# Patient Record
Sex: FEMALE | Race: WHITE | NOT HISPANIC OR LATINO | Employment: UNEMPLOYED | URBAN - METROPOLITAN AREA
[De-identification: names, ages, dates, MRNs, and addresses within clinical notes are randomized per-mention and may not be internally consistent; named-entity substitution may affect disease eponyms.]

---

## 2019-05-03 PROBLEM — I48.0 PAROXYSMAL ATRIAL FIBRILLATION: Status: ACTIVE | Noted: 2019-05-03

## 2019-05-28 PROBLEM — I10 ESSENTIAL HYPERTENSION: Status: ACTIVE | Noted: 2019-05-28

## 2019-08-06 PROBLEM — T73.3XXA FATIGUE DUE TO EXCESSIVE EXERTION: Status: ACTIVE | Noted: 2019-08-06

## 2020-08-18 PROBLEM — M54.42 CHRONIC BILATERAL LOW BACK PAIN WITH BILATERAL SCIATICA: Status: ACTIVE | Noted: 2020-08-18

## 2020-08-18 PROBLEM — M54.2 CERVICALGIA: Status: ACTIVE | Noted: 2020-08-18

## 2020-08-18 PROBLEM — M54.41 CHRONIC BILATERAL LOW BACK PAIN WITH BILATERAL SCIATICA: Status: ACTIVE | Noted: 2020-08-18

## 2020-08-18 PROBLEM — M25.551 PAIN OF BOTH HIP JOINTS: Status: ACTIVE | Noted: 2020-08-18

## 2020-08-18 PROBLEM — G89.29 CHRONIC BILATERAL LOW BACK PAIN WITH BILATERAL SCIATICA: Status: ACTIVE | Noted: 2020-08-18

## 2020-08-18 PROBLEM — M16.0 BILATERAL HIP JOINT ARTHRITIS: Status: ACTIVE | Noted: 2020-08-18

## 2020-08-18 PROBLEM — M25.552 PAIN OF BOTH HIP JOINTS: Status: ACTIVE | Noted: 2020-08-18

## 2020-08-21 PROBLEM — I48.20 ATRIAL FIBRILLATION, CHRONIC: Status: ACTIVE | Noted: 2018-10-19

## 2020-09-01 PROBLEM — B18.2 CHRONIC HEPATITIS C WITHOUT HEPATIC COMA: Status: ACTIVE | Noted: 2020-09-01

## 2020-09-02 ENCOUNTER — TELEPHONE (OUTPATIENT)
Dept: PHARMACY | Facility: CLINIC | Age: 60
End: 2020-09-02

## 2020-09-02 NOTE — TELEPHONE ENCOUNTER
DOCUMENTATION ONLY:  DORIAN epclusa is covered under the patients medicaid plan without authorization for $0

## 2020-09-17 ENCOUNTER — TELEPHONE (OUTPATIENT)
Dept: PHARMACY | Facility: CLINIC | Age: 60
End: 2020-09-17

## 2020-09-17 NOTE — TELEPHONE ENCOUNTER
Initial touch base conducted for AG Epclusa - Name/ confirmed.  Confirmed patient started medication as instructed on 9/10.  Patient confirms that they are taking Epclusa every day at 7 AM.  Patient denies skipping or missing doses.  Patient reports experiencing no side effects since beginning therapy. Patient reports no new medications, otc remedies, or allergies. Patient reminded of lab appointments.  She is taking Tums, as needed for acid reflux;  antacids by 4 hours from AG Epclusa. She states that acid reflux is only associated with certain foods. Patient counseled on importance of maintaining adherence and keeping lab appointments which were scheduled.  Advised to call [facility] and provider if any issues arise.  No questions or concerns. Aware [facility] will call for refills when patient has 7 days of medication on hand.

## 2020-09-30 ENCOUNTER — TELEPHONE (OUTPATIENT)
Dept: PHARMACY | Facility: CLINIC | Age: 60
End: 2020-09-30

## 2020-10-27 ENCOUNTER — SPECIALTY PHARMACY (OUTPATIENT)
Dept: PHARMACY | Facility: CLINIC | Age: 60
End: 2020-10-27

## 2020-10-27 DIAGNOSIS — B18.2 CHRONIC HEPATITIS C WITHOUT HEPATIC COMA: Primary | ICD-10-CM

## 2020-10-27 NOTE — TELEPHONE ENCOUNTER
Specialty Pharmacy - Clinical Reassessment  Specialty Pharmacy - Refill Coordination    Specialty Medication Orders Linked to Encounter      Most Recent Value   Medication #1  sofosbuvir-velpatasvir 400-100 mg Tab (Order#[id number], Rx#[id number])        Tam Goodman is a 60 y.o. female, who is followed by the specialty pharmacy service for management and education.    Encounters since last clinical assessment   No encounters found.   Clinical call attempts since last clinical assessment   No call attempts found.     Today she received follow up education for her specialty medication(s).    Current Outpatient Medications   Medication Sig    albuterol (PROAIR HFA) 90 mcg/actuation inhaler Inhale 2 puffs into the lungs every 6 (six) hours as needed for Wheezing. Rescue    albuterol-ipratropium (DUO-NEB) 2.5 mg-0.5 mg/3 mL nebulizer solution Inhale 3 mLs into the lungs daily as needed.    amLODIPine (NORVASC) 5 MG tablet Take 1 tablet by mouth once daily    amLODIPine (NORVASC) 5 MG tablet Take 1 tablet (5 mg total) by mouth once daily.    apixaban (ELIQUIS) 5 mg Tab Take 1 tablet (5 mg total) by mouth 2 (two) times daily.    atenoloL (TENORMIN) 25 MG tablet Take 2 tablets (50 mg total) by mouth once daily.    mometasone-formoterol (DULERA) 200-5 mcg/actuation inhaler Inhale 2 puffs into the lungs once daily.    montelukast (SINGULAIR) 10 mg tablet Take 1 tablet by mouth once daily    sofosbuvir-velpatasvir 400-100 mg Tab Take 1 tablet by mouth once daily.   Last reviewed on 10/27/2020  3:31 PM by Farida Daniel PharmD    Review of patient's allergies indicates:  No Known AllergiesLast reviewed on  10/27/2020 3:31 PM by Farida Daniel    Drug Interactions    Drug interactions evaluated: yes  Clinically relevant drug interactions identified: no  Provided the patient with educational material regarding drug interactions: not applicable       Medication Adherence    Patient reported X missed doses  in the last month: 0  Any gaps in refill history greater than 2 weeks in the last 3 months: no  Demonstrates understanding of importance of adherence: yes  Informant: patient  Reliability of informant: reliable  Provider-estimated medication adherence level: good  Reasons for non-adherence: no problems identified   Other adherence tool: daily routines   Confirmed plan for next specialty medication refill: delivery by pharmacy  Refills needed for supportive medications: not needed       Adverse Effects    *All other systems reviewed and are negative       Assessment Questions - Documented Responses      Most Recent Value   Assessment   Medication Reconciliation completed for patient  Yes   During the past 4 weeks, has patient missed any activities due to condition or medication?  No   During the past 4 weeks, did patient have any of the following urgent care visits?  None   Goals of Therapy Status  Achieving   Welcome packet contents reviewed and discussed with patient?  No   Assesment completed?  Yes   Plan  Therapy continued   Do you need to open a clinical intervention (i-vent)?  No   Do you want to schedule first shipment?  No   Medication #1 Assessment Info   Patient status  Existing medication   Is this medication appropriate for the patient?  Yes   Is this medication effective?  Yes        Refill Questions - Documented Responses      Most Recent Value   Relationship to patient of person spoken to?  Self   HIPAA/medical authority confirmed?  Yes   Any changes in contact preferences or allowed representatives?  No   Has the patient had any insurance changes?  No   Has the patient had any changes to specialty medication, dose, or instructions?  No   Has the patient started taking any new medications, herbals, or supplements?  No   Has the patient been diagnosed with any new medical conditions?  No   Does the patient have any new allergies to medications or foods?  No   Does the patient have any concerns about  "side effects?  No   Can the patient store medication/sharps container properly (at the correct temperature, away from children/pets, etc.)?  Yes   Does the patient have any concerns or questions about taking or administering this medication as prescribed?  No   How many doses did the patient miss in the past 4 weeks or since the last fill?  0   How many doses does the patient have on hand?  8   How many days does the patient report on hand quantity will last?  8   Does the number of doses/days supply remaining match pharmacy expected amounts?  Yes   How will the patient receive the medication?  Mail   When does the patient need to receive the medication?  11/03/20   Shipping Address  Home   Address in [facility] confirmed and updated if neccessary?  Yes   Expected Copay ($)  0   Is the patient able to afford the medication copay?  Yes   Payment Method  zero copay   Days supply of Refill  28   Would patient like to speak to a pharmacist?  Yes   Do you want to trigger an intervention?  No   Do you want to trigger an additional referral task?  No   Refill activity completed?  Yes   Refill activity plan  Refill scheduled   Shipment/Pickup Date:  10/29/20          Objective    She has a past medical history of Arthritis, Asthma, Essential hypertension (5/28/2019), Hepatitis C virus, and Paroxysmal atrial fibrillation (5/3/2019).      BP Readings from Last 4 Encounters:   10/13/20 133/83   09/01/20 135/81   08/18/20 127/81   07/08/20 130/66     Ht Readings from Last 4 Encounters:   10/13/20 5' 4" (1.626 m)   09/01/20 5' 4" (1.626 m)   08/18/20 5' 4" (1.626 m)   07/08/20 5' 4" (1.626 m)     Wt Readings from Last 4 Encounters:   10/13/20 72.6 kg (160 lb)   09/01/20 73.2 kg (161 lb 6.4 oz)   08/18/20 73.5 kg (162 lb)   07/08/20 75.2 kg (165 lb 11.2 oz)     No results found for: HCVGENOTYPE  Recent Labs   Lab Result Units 10/13/20  1432 09/01/20  1557 08/18/20  1522 08/18/20  1521   Creatinine mg/dL 0.99  --  0.90  --  "   ALT U/L 14  --  73 H  --    AST U/L 7 L  --  40  --    Hepatitis B Surface Ag   --   --   --  Negative   Hep B S Ab   --  Negative  --   --    Hep B Core Total Ab   --  Negative  --   --      The goals of Hepatitis C Virus (HCV) infection treatment include:  · Reducing all-cause mortality and liver-related health adverse consequences, including cirrhosis, end-stage liver disease, and hepatocellular carcinoma  · Achieving virologic cure as evidenced by a sustained virologic response  · Improving or maintaining quality of life  · Maintaining optimal therapy adherence  · Minimizing and managing side effects  · Preventing the transmission of HCV      Goals of Therapy Status: Achieving    Assessment/Plan  Patient plans to continue therapy without changes      Indication, dosage, appropriateness, effectiveness, safety and convenience of her specialty medication(s) were reviewed today.     Patient Counseling    Counseled the patient on the following: doses and administration discussed, safe handling, storage, and disposal discussed, possible adverse effects and management discussed, possible drug and prescription drug interactions discussed, possible drug and OTC drug and food interactions discussed, lab monitoring and follow-up discussed, therapeutic rationale discussed, cost of medications and cost implications discussed, adherence and missed doses discussed, pharmacy contact information discussed       Epclusa refill (3 of 3) confirmed and reassessment complete. We will ship Epclusa refill on 10/29 via fedex to arrive on 10/30. $0.00 copay- 004. Confirmed 2 patient identifiers - name and . Therapy appropriate.     Patient has 8-9 doses of Epclusa remaining and takes it around 7am daily.  Pt reports they are not having any side effects so far. No missed doses, no new medications, no new allergies or health conditions reported at this time.    Patient reports having UTI. She plans to go to  for assessment tomorrow,  but wants to know if AZO would interfere with Epclusa. Advised the medications are safe for coadministration. All questions answered and addressed to patients satisfaction. Advised to call [facility] and provider if any issues arise.  Pt verbalized understanding.    peTasks added this encounter   No tasks added.   Tasks due within next 3 months   No tasks due.     Farida Daniel, PharmD  [facility]  [address] [place] [id number]  Phone: [phone]  Fax: [phone]

## 2020-11-01 PROBLEM — T78.40XA ALLERGIC DRUG REACTION: Status: ACTIVE | Noted: 2020-11-01

## 2020-11-01 PROBLEM — B19.20 HEPATITIS C INFECTION: Status: ACTIVE | Noted: 2020-09-01

## 2020-11-01 PROBLEM — J45.20 MILD INTERMITTENT ASTHMA: Status: ACTIVE | Noted: 2020-11-01

## 2020-11-01 PROBLEM — B37.0 ORAL THRUSH: Status: ACTIVE | Noted: 2020-11-01

## 2020-11-01 PROBLEM — L51.1 STEVENS-JOHNSON SYNDROME: Status: ACTIVE | Noted: 2020-11-01

## 2020-11-01 PROBLEM — T36.95XA ALLERGIC REACTION DUE TO ANTIBACTERIAL DRUG: Status: ACTIVE | Noted: 2020-11-01

## 2020-11-01 PROBLEM — T78.3XXA ANGIOEDEMA: Status: ACTIVE | Noted: 2020-11-01

## 2020-11-02 PROBLEM — T78.3XXA ANGIOEDEMA: Status: RESOLVED | Noted: 2020-11-01 | Resolved: 2020-11-02

## 2021-01-13 PROBLEM — I48.0 PAF (PAROXYSMAL ATRIAL FIBRILLATION): Status: ACTIVE | Noted: 2021-01-13

## 2021-03-09 PROBLEM — B18.2 CHRONIC HEPATITIS C WITHOUT HEPATIC COMA: Status: RESOLVED | Noted: 2020-09-01 | Resolved: 2021-03-09

## 2021-03-09 PROBLEM — T73.3XXA FATIGUE DUE TO EXCESSIVE EXERTION: Status: RESOLVED | Noted: 2019-08-06 | Resolved: 2021-03-09

## 2021-05-12 ENCOUNTER — PATIENT MESSAGE (OUTPATIENT)
Dept: RESEARCH | Facility: HOSPITAL | Age: 61
End: 2021-05-12

## 2021-08-18 PROBLEM — B37.0 ORAL THRUSH: Status: RESOLVED | Noted: 2020-11-01 | Resolved: 2021-08-18
